# Patient Record
Sex: FEMALE | Race: WHITE | NOT HISPANIC OR LATINO | Employment: FULL TIME | URBAN - METROPOLITAN AREA
[De-identification: names, ages, dates, MRNs, and addresses within clinical notes are randomized per-mention and may not be internally consistent; named-entity substitution may affect disease eponyms.]

---

## 2020-11-25 ENCOUNTER — VIRTUAL VISIT (OUTPATIENT)
Dept: FAMILY MEDICINE | Facility: OTHER | Age: 40
End: 2020-11-25
Payer: COMMERCIAL

## 2020-11-25 DIAGNOSIS — Z20.822 ENCOUNTER FOR LABORATORY TESTING FOR COVID-19 VIRUS: Primary | ICD-10-CM

## 2020-11-25 PROCEDURE — U0003 INFECTIOUS AGENT DETECTION BY NUCLEIC ACID (DNA OR RNA); SEVERE ACUTE RESPIRATORY SYNDROME CORONAVIRUS 2 (SARS-COV-2) (CORONAVIRUS DISEASE [COVID-19]), AMPLIFIED PROBE TECHNIQUE, MAKING USE OF HIGH THROUGHPUT TECHNOLOGIES AS DESCRIBED BY CMS-2020-01-R: HCPCS | Performed by: FAMILY MEDICINE

## 2020-11-25 PROCEDURE — 99421 OL DIG E/M SVC 5-10 MIN: CPT | Performed by: PHYSICIAN ASSISTANT

## 2020-11-25 NOTE — PROGRESS NOTES
"Date: 2020 07:34:45  Clinician: Nuris Hartley  Clinician NPI: [id number]  Patient: Cassidy Klein  Patient : 1980  Patient Address: [address] [place] [address]  Patient Phone: [phone]  Visit Protocol: URI  Patient Summary:  Cassidy is a 40 year old ( : 1980 ) female who initiated a [facility] Visit for COVID-19 (Coronavirus) evaluation and screening. When asked the question \"Please sign me up to receive news, health information and promotions from [facility].\", Cassidy responded \"No\".    Cassidy states her symptoms started gradually 3-4 days ago. After her symptoms started, they improved and then got worse again.   Her symptoms consist of a cough, nasal congestion, myalgia, and a sore throat.   Symptom details     Nasal secretions: The color of her mucus is clear.    Cough: Cassidy coughs every 5-10 minutes and her cough is more bothersome at night. Phlegm comes into her throat when she coughs. She does not believe her cough is caused by post-nasal drip. The color of the phlegm is white, clear, and yellow.     Sore throat: Cassidy reports having severe throat pain (7-9 on a 10 point pain scale), does not have exudate on her tonsils, and can swallow liquids. She is not sure if the lymph nodes in her neck are enlarged. A rash has not appeared on the skin since the sore throat started.      Cassidy denies having ear pain, headache, wheezing, fever, nausea, vomiting, rhinitis, facial pain or pressure, chills, malaise, teeth pain, ageusia, diarrhea, and anosmia. She also denies taking antibiotic medication in the past month and having recent facial or sinus surgery in the past 60 days. She is not experiencing dyspnea.   Precipitating events  Within the past week, Cassidy has not been exposed to someone with strep throat. She has not recently been exposed to someone with influenza. Cassidy has been in close contact with the following high risk individuals: children under the age of 5 and people with " asthma, heart disease or diabetes.   Pertinent COVID-19 (Coronavirus) information  Cassidy does not work or volunteer as healthcare worker or a . In the past 14 days, Cassidy has not worked or volunteered at a healthcare facility or group living setting.   In the past 14 days, she also has not lived in a congregate living setting.   Cassidy has not had a close contact with a laboratory-confirmed COVID-19 patient within 14 days of symptom onset.    Since December 2019, Cassidy has been tested for COVID-19 and has not had upper respiratory infection or influenza-like illness.      Result of COVID-19 test: Negative     Date of her COVID-19 test: 10/01/2020      Pertinent medical history  She has not been told by her provider to avoid NSAIDs.   Cassidy does not get yeast infections when she takes antibiotics.   Cassidy does not have diabetes. She denies having immunosuppressive conditions (e.g., chemotherapy, HIV, organ transplant, long-term use of steroids or other immunosuppressive medications, splenectomy). She does not have severe COPD and congestive heart failure. She does not have asthma.   Cassidy does not need a return to work/school note.   Weight: 130 lbs   Cassidy does not smoke or use smokeless tobacco.   She denies pregnancy and denies breastfeeding. She has menstruated in the past month.   Weight: 130 lbs    MEDICATIONS: levothyroxine oral, ALLERGIES: NKDA  Clinician Response:  Dear Cassidy,  Your health is our priority. Based on the information you have provided, it is possible that you may have some type of viral infection.  Please read the full treatment plan and see my recommendations below.  Medication information  Because you have a viral infection, antibiotics will not help you get better. Treating a viral infection with antibiotics could actually make you feel worse.  Self care  Steps you can take to be as comfortable as possible:     Rest.    Drink plenty of fluids.    Take a warm shower to loosen  congestion    Use a cool-mist humidifier.    Use throat lozenges.    Suck on frozen items such as popsicles.    Drink hot tea with lemon and honey.    Gargle with warm salt water (1/4 teaspoon of salt per 8 ounce glass of water).    Take a spoonful of honey to reduce your cough.     Additional treatment plan   Your symptoms show that you may have coronavirus (COVID-19). This illness can cause fever, cough and trouble breathing. Many people get a mild case and get better on their own. Some people can get very sick.  What should I do?  We would like to test you for this virus.   1. Please call [phone] to schedule your visit. Explain that you were referred by [facility] to have a COVID-19 test. Be ready to share your [facility] visit ID number.  * If you need to schedule in [facility] please call [phone] or for [employer] employees please call [phone].  * If you need to schedule in the [place] area please call [phone]. [facility] employees call [phone].  The following will serve as your written order for this COVID Test, ordered by me, for the indication of suspected COVID [Z20.828]: The test will be ordered in [employer], our electronic health record, after you are scheduled. It will show as ordered and authorized by Be Adame MD.  Order: COVID-19 (Coronavirus) PCR for SYMPTOMATIC testing from [facility].   2. When it's time for your COVID test:  Stay at least 6 feet away from others. (If someone will drive you to your test, stay in the backseat, as far away from the  as you can.)   Cover your mouth and nose with a mask, tissue or washcloth.  Go straight to the testing site. Don't make any stops on the way there or back.      3.Starting now: Stay home and away from others (self-isolate) until:   You've had no fever---and no medicine that reduces fever---for one full day (24 hours). And...   Your other symptoms have gotten better. For example, your cough or breathing has improved. And...   At least 10 days  "have passed since your symptoms started.       During this time, don't leave the house except for testing or medical care.   Stay in your own room, even for meals. Use your own bathroom if you can.   Stay away from others in your home. No hugging, kissing or shaking hands. No visitors.  Don't go to work, school or anywhere else.    Clean \"high touch\" surfaces often (doorknobs, counters, handles, etc.). Use a household cleaning spray or wipes. You'll find a full list of  on the EPA website: www.epa.gov/pesticide-registration/list-n-disinfectants-use-against-sars-cov-2.   Cover your mouth and nose with a mask, tissue or washcloth to avoid spreading germs.  Wash your hands and face often. Use soap and water.  Caregivers in these groups are at risk for severe illness due to COVID-19:  o People 65 years and older  o People who live in a nursing home or long-term care facility  o People with chronic disease (lung, heart, cancer, diabetes, kidney, liver, immunologic)  o People who have a weakened immune system, including those who:   Are in cancer treatment  Take medicine that weakens the immune system, such as corticosteroids  Had a bone marrow or organ transplant  Have an immune deficiency  Have poorly controlled HIV or AIDS  Are obese (body mass index of 40 or higher)  Smoke regularly   o Caregivers should wear gloves while washing dishes, handling laundry and cleaning bedrooms and bathrooms.  o Use caution when washing and drying laundry: Don't shake dirty laundry, and use the warmest water setting that you can.  o For more tips, go to www.cdc.gov/coronavirus/2019-ncov/downloads/10Things.pdf.    4.Sign up for [employer]. We know it's scary to hear that you might have COVID-19. We want to track your symptoms to make sure you're okay over the next 2 weeks. Please look for an email from [employer]---this is a free, online program that we'll use to keep in touch. To sign up, follow the link in the email. Learn " more at [email or web address]  How can I take care of myself?   Get lots of rest. Drink extra fluids (unless a doctor has told you not to).   Take Tylenol (acetaminophen) for fever or pain. If you have liver or kidney problems, ask your family doctor if it's okay to take Tylenol.   Adults can take either:    650 mg (two 325 mg pills) every 4 to 6 hours, or...   1,000 mg (two 500 mg pills) every 8 hours as needed.    Note: Don't take more than 3,000 mg in one day. Acetaminophen is found in many medicines (both prescribed and over-the-counter medicines). Read all labels to be sure you don't take too much.   For children, check the Tylenol bottle for the right dose. The dose is based on the child's age or weight.    If you have other health problems (like cancer, heart failure, an organ transplant or severe kidney disease): Call your specialty clinic if you don't feel better in the next 2 days.       Know when to call 911. Emergency warning signs include:    Trouble breathing or shortness of breath Pain or pressure in the chest that doesn't go away Feeling confused like you haven't felt before, or not being able to wake up Bluish-colored lips or face.  Where can I get more information?   [facility] -- About COVID-19: [email or web address]   CDC -- What to Do If You're Sick: www.cdc.gov/coronavirus/2019-ncov/about/steps-when-sick.html   CDC -- Ending Home Isolation: www.cdc.gov/coronavirus/2019-ncov/hcp/disposition-in-home-patients.html   CDC -- Caring for Someone: www.cdc.gov/coronavirus/2019-ncov/if-you-are-sick/care-for-someone.html   [facility] -- Interim Guidance for Hospital Discharge to Home: www.health.[facility].[place].us/diseases/coronavirus/hcp/hospdischarge.pdf   [facility] clinical trials (COVID-19 research studies): clinicalaffairs.[facility]/n-clinical-trials    Below are the COVID-19 hotlines at the [place] Department of Health ([facility]). Interpreters are available.    [facility]  questions: Call 131-192-5407 or 1-445.869.3373 (7 a.m. to 7 p.m.) For questions about schools and childcare: Call 978-684-0819 or 1-390.659.3152 (7 a.m. to 7 p.m.)    COVID-19 (Coronavirus) General Information  Because there is currently no vaccine to prevent infection, the best way to protect yourself is to avoid being exposed to this virus. Common symptoms of COVID-19 include but are not limited to fever, cough, and shortness of breath. These symptoms appear 2-14 days after you are exposed to the virus that causes COVID-19. Click here for more information from the CDC on how to protect yourself.  If you are sick with COVID-19 or suspect you are infected with the virus that causes COVID-19, follow the steps here from the CDC to help prevent the disease from spreading to people in your home and community.  Click here for general information from the CDC on testing.  If you develop any of these emergency warning signs for COVID-19, get medical attention immediately:     Trouble breathing    Persistent pain or pressure in the chest    New confusion or inability to arouse    Bluish lips or face      Call your doctor or clinic before going in. Call [phone] if you have a medical emergency and notify the  you have or think you may have COVID-19.  For more detailed and up to date information on COVID-19 (Coronavirus), please visit the CDC website.   Diagnosis: Cough  Diagnosis ICD: R05

## 2020-11-26 LAB
SARS-COV-2 RNA SPEC QL NAA+PROBE: NOT DETECTED
SPECIMEN SOURCE: NORMAL

## 2021-01-15 ENCOUNTER — HEALTH MAINTENANCE LETTER (OUTPATIENT)
Age: 41
End: 2021-01-15

## 2021-09-19 ENCOUNTER — HEALTH MAINTENANCE LETTER (OUTPATIENT)
Age: 41
End: 2021-09-19

## 2022-03-06 ENCOUNTER — HEALTH MAINTENANCE LETTER (OUTPATIENT)
Age: 42
End: 2022-03-06

## 2022-11-21 ENCOUNTER — HEALTH MAINTENANCE LETTER (OUTPATIENT)
Age: 42
End: 2022-11-21

## 2022-12-13 ENCOUNTER — HOSPITAL ENCOUNTER (EMERGENCY)
Facility: CLINIC | Age: 42
Discharge: HOME | End: 2022-12-13
Attending: PHYSICIAN ASSISTANT | Admitting: PHYSICIAN ASSISTANT
Payer: COMMERCIAL

## 2022-12-13 VITALS
DIASTOLIC BLOOD PRESSURE: 80 MMHG | WEIGHT: 135 LBS | OXYGEN SATURATION: 97 % | TEMPERATURE: 99 F | HEART RATE: 91 BPM | RESPIRATION RATE: 18 BRPM | SYSTOLIC BLOOD PRESSURE: 116 MMHG | BODY MASS INDEX: 23.05 KG/M2 | HEIGHT: 64 IN

## 2022-12-13 DIAGNOSIS — Z20.3 NEED FOR POST EXPOSURE PROPHYLAXIS FOR RABIES: ICD-10-CM

## 2022-12-13 DIAGNOSIS — Z20.9 EXPOSURE TO BAT WITHOUT KNOWN BITE: ICD-10-CM

## 2022-12-13 PROCEDURE — G0463 HOSPITAL OUTPT CLINIC VISIT: HCPCS | Mod: 25 | Performed by: PHYSICIAN ASSISTANT

## 2022-12-13 PROCEDURE — 96372 THER/PROPH/DIAG INJ SC/IM: CPT | Performed by: PHYSICIAN ASSISTANT

## 2022-12-13 PROCEDURE — 90471 IMMUNIZATION ADMIN: CPT | Performed by: PHYSICIAN ASSISTANT

## 2022-12-13 PROCEDURE — 99213 OFFICE O/P EST LOW 20 MIN: CPT | Performed by: PHYSICIAN ASSISTANT

## 2022-12-13 PROCEDURE — 90675 RABIES VACCINE IM: CPT | Performed by: PHYSICIAN ASSISTANT

## 2022-12-13 PROCEDURE — 90375 RABIES IG IM/SC: CPT | Performed by: PHYSICIAN ASSISTANT

## 2022-12-13 PROCEDURE — 250N000011 HC RX IP 250 OP 636: Performed by: PHYSICIAN ASSISTANT

## 2022-12-13 RX ORDER — ALBUTEROL SULFATE 90 UG/1
1-2 AEROSOL, METERED RESPIRATORY (INHALATION)
Status: CANCELLED
Start: 2022-12-13

## 2022-12-13 RX ORDER — ALBUTEROL SULFATE 0.83 MG/ML
2.5 SOLUTION RESPIRATORY (INHALATION)
Status: CANCELLED | OUTPATIENT
Start: 2022-12-13

## 2022-12-13 RX ORDER — EPINEPHRINE 1 MG/ML
0.3 INJECTION, SOLUTION, CONCENTRATE INTRAVENOUS EVERY 5 MIN PRN
Status: CANCELLED | OUTPATIENT
Start: 2022-12-13

## 2022-12-13 RX ORDER — DIPHENHYDRAMINE HYDROCHLORIDE 50 MG/ML
50 INJECTION INTRAMUSCULAR; INTRAVENOUS
Status: CANCELLED
Start: 2022-12-13

## 2022-12-13 RX ORDER — METHYLPREDNISOLONE SODIUM SUCCINATE 125 MG/2ML
125 INJECTION, POWDER, LYOPHILIZED, FOR SOLUTION INTRAMUSCULAR; INTRAVENOUS
Status: CANCELLED
Start: 2022-12-13

## 2022-12-13 RX ORDER — MEPERIDINE HYDROCHLORIDE 25 MG/ML
25 INJECTION INTRAMUSCULAR; INTRAVENOUS; SUBCUTANEOUS EVERY 30 MIN PRN
Status: CANCELLED | OUTPATIENT
Start: 2022-12-13

## 2022-12-13 RX ADMIN — Medication 1 ML: at 17:52

## 2022-12-13 RX ADMIN — RABIES IMMUNE GLOBULIN (HUMAN) 1230 UNITS: 300 INJECTION, SOLUTION INFILTRATION; INTRAMUSCULAR at 17:52

## 2022-12-13 ASSESSMENT — ACTIVITIES OF DAILY LIVING (ADL): ADLS_ACUITY_SCORE: 33

## 2022-12-13 NOTE — ED TRIAGE NOTES
Exposure to bat last night in same room     Triage Assessment     Row Name 12/13/22 9497       Triage Assessment (Adult)    Airway WDL WDL       Respiratory WDL    Respiratory WDL WDL

## 2022-12-14 NOTE — ED PROVIDER NOTES
"  History     Chief Complaint   Patient presents with     Bat Exposure     HPI  Cassidy Klein is a 42 year old female who presents to the urgent care accompanied by child with concern over exposure to a bat last night.  Patient was attempting to sleep last night out of wrestling sound and has not been wearing them.  She was initially unable to identify where it was however later discovered to a bat.  She denies any obvious bites or stings but was potentially sleeping per part of the exposure.  Her tetanus vaccine is up to date.      Allergies:  Allergies   Allergen Reactions     Robitussin Cough-Allergy      Problem List:    There are no problems to display for this patient.     Past Medical History:    No past medical history on file.    Past Surgical History:    No past surgical history on file.    Family History:    No family history on file.    Social History:  Marital Status:   [2]        Medications:    No current outpatient medications on file.    Review of Systems  CONSTITUTIONAL:NEGATIVE for fever, chills, change in weight  INTEGUMENTARY/SKIN: NEGATIVE for worrisome rashes, moles or lesions  EYES: NEGATIVE for vision changes or irritation  ENT/MOUTH: NEGATIVE for ear, mouth and throat problems  RESP:NEGATIVE for significant cough or SOB  GI: NEGATIVE for nausea, abdominal pain, heartburn, or change in bowel habits  Physical Exam   BP: 116/80  Pulse: 91  Temp: 99  F (37.2  C)  Resp: 18  Height: 162.6 cm (5' 4\")  Weight: 61.2 kg (135 lb)  SpO2: 97 %  Physical Exam  GENERAL APPEARANCE: healthy, alert cooperative no acute distress  EYES: EOMI,  PERRL, conjunctiva clear  HENT: normocephalic, atraumatic   NECK: supple, nontender, no lymphadenopathy  RESP: no respiratory distress, speaking in full sentences without difficulty.   SKIN: no suspicious lesions or rashes on exposed areas of skin   ED Course           Procedures       Critical Care time:  none          No results found for this or any previous " visit (from the past 24 hour(s)).    Medications   rabies vaccine, PCEC (chick embryo derived) (RABAVERT) injection 1 mL (1 mL Intramuscular Given 12/13/22 1752)   rabies immune globulin 300 units/mL (HYPERAB) injection 1,230 Units (1,230 Units Intramuscular Given 12/13/22 1752)     Assessments & Plan (with Medical Decision Making)     I have reviewed the nursing notes.  I have reviewed the findings, diagnosis, plan and need for follow up with the patient.       There are no discharge medications for this patient.    Final diagnoses:   Exposure to bat without known bite   Need for post exposure prophylaxis for rabies     22-year-old female presents to the urgent care with concern over potential rabies prophylaxis after she awoke with a bat in her room last night.  Physical exam findings were benign.  Discussed with patient that she would qualify for rabies vaccination as well as HyperRAB injection.  She tolerated injections without any immediate complications.  She was discharged home with instructions to receive second dose of rabies vaccination 10/16/2022 and subsequently on day 7 and day 14.  Patient states understanding.  All questions were answered prior to discharge.      12/13/2022   [facility] EMERGENCY DEPT     Princess Ugarte PA-C  12/13/22 2021

## 2022-12-16 ENCOUNTER — HOSPITAL ENCOUNTER (EMERGENCY)
Facility: CLINIC | Age: 42
Discharge: HOME | End: 2022-12-16
Admitting: PHYSICIAN ASSISTANT
Payer: COMMERCIAL

## 2022-12-16 VITALS — RESPIRATION RATE: 20 BRPM | OXYGEN SATURATION: 98 % | HEART RATE: 87 BPM | TEMPERATURE: 99.3 F

## 2022-12-16 DIAGNOSIS — Z20.9 EXPOSURE TO BAT WITHOUT KNOWN BITE: ICD-10-CM

## 2022-12-16 DIAGNOSIS — Z20.3 NEED FOR POST EXPOSURE PROPHYLAXIS FOR RABIES: ICD-10-CM

## 2022-12-16 PROCEDURE — 90675 RABIES VACCINE IM: CPT | Performed by: PHYSICIAN ASSISTANT

## 2022-12-16 PROCEDURE — 90471 IMMUNIZATION ADMIN: CPT | Performed by: PHYSICIAN ASSISTANT

## 2022-12-16 PROCEDURE — 999N000064 HC STATISTIC EXAM NO CHARGES: Performed by: PHYSICIAN ASSISTANT

## 2022-12-16 PROCEDURE — 250N000011 HC RX IP 250 OP 636: Performed by: PHYSICIAN ASSISTANT

## 2022-12-16 RX ORDER — MEPERIDINE HYDROCHLORIDE 25 MG/ML
25 INJECTION INTRAMUSCULAR; INTRAVENOUS; SUBCUTANEOUS EVERY 30 MIN PRN
Status: CANCELLED | OUTPATIENT
Start: 2022-12-20

## 2022-12-16 RX ORDER — ALBUTEROL SULFATE 90 UG/1
1-2 AEROSOL, METERED RESPIRATORY (INHALATION)
Status: CANCELLED
Start: 2022-12-20

## 2022-12-16 RX ORDER — MEPERIDINE HYDROCHLORIDE 25 MG/ML
25 INJECTION INTRAMUSCULAR; INTRAVENOUS; SUBCUTANEOUS EVERY 30 MIN PRN
Status: DISCONTINUED | OUTPATIENT
Start: 2022-12-16 | End: 2022-12-16 | Stop reason: HOSPADM

## 2022-12-16 RX ORDER — EPINEPHRINE 1 MG/ML
0.3 INJECTION, SOLUTION, CONCENTRATE INTRAVENOUS EVERY 5 MIN PRN
Status: CANCELLED | OUTPATIENT
Start: 2022-12-20

## 2022-12-16 RX ORDER — EPINEPHRINE 1 MG/ML
0.3 INJECTION, SOLUTION, CONCENTRATE INTRAVENOUS EVERY 5 MIN PRN
Status: DISCONTINUED | OUTPATIENT
Start: 2022-12-16 | End: 2022-12-16 | Stop reason: HOSPADM

## 2022-12-16 RX ORDER — ALBUTEROL SULFATE 0.83 MG/ML
2.5 SOLUTION RESPIRATORY (INHALATION)
Status: CANCELLED | OUTPATIENT
Start: 2022-12-20

## 2022-12-16 RX ORDER — METHYLPREDNISOLONE SODIUM SUCCINATE 125 MG/2ML
125 INJECTION, POWDER, LYOPHILIZED, FOR SOLUTION INTRAMUSCULAR; INTRAVENOUS
Status: DISCONTINUED | OUTPATIENT
Start: 2022-12-16 | End: 2022-12-16 | Stop reason: HOSPADM

## 2022-12-16 RX ORDER — METHYLPREDNISOLONE SODIUM SUCCINATE 125 MG/2ML
125 INJECTION, POWDER, LYOPHILIZED, FOR SOLUTION INTRAMUSCULAR; INTRAVENOUS
Status: CANCELLED
Start: 2022-12-20

## 2022-12-16 RX ORDER — ALBUTEROL SULFATE 90 UG/1
1-2 AEROSOL, METERED RESPIRATORY (INHALATION)
Status: DISCONTINUED | OUTPATIENT
Start: 2022-12-16 | End: 2022-12-16 | Stop reason: HOSPADM

## 2022-12-16 RX ORDER — ALBUTEROL SULFATE 0.83 MG/ML
2.5 SOLUTION RESPIRATORY (INHALATION)
Status: DISCONTINUED | OUTPATIENT
Start: 2022-12-16 | End: 2022-12-16 | Stop reason: HOSPADM

## 2022-12-16 RX ORDER — DIPHENHYDRAMINE HYDROCHLORIDE 50 MG/ML
50 INJECTION INTRAMUSCULAR; INTRAVENOUS
Status: DISCONTINUED | OUTPATIENT
Start: 2022-12-16 | End: 2022-12-16 | Stop reason: HOSPADM

## 2022-12-16 RX ORDER — DIPHENHYDRAMINE HYDROCHLORIDE 50 MG/ML
50 INJECTION INTRAMUSCULAR; INTRAVENOUS
Status: CANCELLED
Start: 2022-12-20

## 2022-12-16 RX ADMIN — Medication 1 ML: at 17:34

## 2022-12-16 ASSESSMENT — ACTIVITIES OF DAILY LIVING (ADL): ADLS_ACUITY_SCORE: 35

## 2022-12-20 ENCOUNTER — HOSPITAL ENCOUNTER (EMERGENCY)
Facility: CLINIC | Age: 42
Discharge: HOME | End: 2022-12-20
Admitting: PHYSICIAN ASSISTANT
Payer: COMMERCIAL

## 2022-12-20 VITALS
SYSTOLIC BLOOD PRESSURE: 102 MMHG | HEART RATE: 93 BPM | TEMPERATURE: 98.3 F | OXYGEN SATURATION: 98 % | RESPIRATION RATE: 18 BRPM | DIASTOLIC BLOOD PRESSURE: 74 MMHG

## 2022-12-20 PROCEDURE — 90675 RABIES VACCINE IM: CPT | Performed by: PHYSICIAN ASSISTANT

## 2022-12-20 PROCEDURE — 250N000011 HC RX IP 250 OP 636: Performed by: PHYSICIAN ASSISTANT

## 2022-12-20 PROCEDURE — 90471 IMMUNIZATION ADMIN: CPT | Performed by: PHYSICIAN ASSISTANT

## 2022-12-20 PROCEDURE — 999N000104 HC STATISTIC NO CHARGE: Performed by: PHYSICIAN ASSISTANT

## 2022-12-20 RX ADMIN — Medication 1 ML: at 17:39

## 2022-12-27 ENCOUNTER — HOSPITAL ENCOUNTER (EMERGENCY)
Facility: CLINIC | Age: 42
Discharge: HOME | End: 2022-12-27
Admitting: PHYSICIAN ASSISTANT
Payer: COMMERCIAL

## 2022-12-27 DIAGNOSIS — Z20.9 EXPOSURE TO BAT WITHOUT KNOWN BITE: ICD-10-CM

## 2022-12-27 DIAGNOSIS — Z20.3 NEED FOR POST EXPOSURE PROPHYLAXIS FOR RABIES: ICD-10-CM

## 2022-12-27 PROCEDURE — 90675 RABIES VACCINE IM: CPT | Performed by: PHYSICIAN ASSISTANT

## 2022-12-27 PROCEDURE — 250N000011 HC RX IP 250 OP 636: Performed by: PHYSICIAN ASSISTANT

## 2022-12-27 PROCEDURE — 999N000104 HC STATISTIC NO CHARGE: Performed by: PHYSICIAN ASSISTANT

## 2022-12-27 PROCEDURE — 90471 IMMUNIZATION ADMIN: CPT | Performed by: PHYSICIAN ASSISTANT

## 2022-12-27 RX ORDER — DIPHENHYDRAMINE HYDROCHLORIDE 50 MG/ML
50 INJECTION INTRAMUSCULAR; INTRAVENOUS
Status: DISCONTINUED | OUTPATIENT
Start: 2022-12-27 | End: 2022-12-27 | Stop reason: HOSPADM

## 2022-12-27 RX ORDER — ALBUTEROL SULFATE 90 UG/1
1-2 AEROSOL, METERED RESPIRATORY (INHALATION)
Status: DISCONTINUED | OUTPATIENT
Start: 2022-12-27 | End: 2022-12-27 | Stop reason: HOSPADM

## 2022-12-27 RX ORDER — METHYLPREDNISOLONE SODIUM SUCCINATE 125 MG/2ML
125 INJECTION, POWDER, LYOPHILIZED, FOR SOLUTION INTRAMUSCULAR; INTRAVENOUS
Status: CANCELLED
Start: 2022-12-30

## 2022-12-27 RX ORDER — MEPERIDINE HYDROCHLORIDE 25 MG/ML
25 INJECTION INTRAMUSCULAR; INTRAVENOUS; SUBCUTANEOUS EVERY 30 MIN PRN
Status: CANCELLED | OUTPATIENT
Start: 2022-12-30

## 2022-12-27 RX ORDER — DIPHENHYDRAMINE HYDROCHLORIDE 50 MG/ML
50 INJECTION INTRAMUSCULAR; INTRAVENOUS
Status: CANCELLED
Start: 2022-12-30

## 2022-12-27 RX ORDER — EPINEPHRINE 1 MG/ML
0.3 INJECTION, SOLUTION, CONCENTRATE INTRAVENOUS EVERY 5 MIN PRN
Status: DISCONTINUED | OUTPATIENT
Start: 2022-12-27 | End: 2022-12-27 | Stop reason: HOSPADM

## 2022-12-27 RX ORDER — ALBUTEROL SULFATE 0.83 MG/ML
2.5 SOLUTION RESPIRATORY (INHALATION)
Status: CANCELLED | OUTPATIENT
Start: 2022-12-30

## 2022-12-27 RX ORDER — EPINEPHRINE 1 MG/ML
0.3 INJECTION, SOLUTION, CONCENTRATE INTRAVENOUS EVERY 5 MIN PRN
Status: CANCELLED | OUTPATIENT
Start: 2022-12-30

## 2022-12-27 RX ORDER — ALBUTEROL SULFATE 0.83 MG/ML
2.5 SOLUTION RESPIRATORY (INHALATION)
Status: DISCONTINUED | OUTPATIENT
Start: 2022-12-27 | End: 2022-12-27 | Stop reason: HOSPADM

## 2022-12-27 RX ORDER — MEPERIDINE HYDROCHLORIDE 25 MG/ML
25 INJECTION INTRAMUSCULAR; INTRAVENOUS; SUBCUTANEOUS EVERY 30 MIN PRN
Status: DISCONTINUED | OUTPATIENT
Start: 2022-12-27 | End: 2022-12-27 | Stop reason: HOSPADM

## 2022-12-27 RX ORDER — ALBUTEROL SULFATE 90 UG/1
1-2 AEROSOL, METERED RESPIRATORY (INHALATION)
Status: CANCELLED
Start: 2022-12-30

## 2022-12-27 RX ORDER — METHYLPREDNISOLONE SODIUM SUCCINATE 125 MG/2ML
125 INJECTION, POWDER, LYOPHILIZED, FOR SOLUTION INTRAMUSCULAR; INTRAVENOUS
Status: DISCONTINUED | OUTPATIENT
Start: 2022-12-27 | End: 2022-12-27 | Stop reason: HOSPADM

## 2022-12-27 RX ADMIN — Medication 1 ML: at 18:06

## 2023-04-07 ENCOUNTER — APPOINTMENT (OUTPATIENT)
Dept: LAB | Facility: CLINIC | Age: 43
End: 2023-04-07
Payer: COMMERCIAL

## 2023-04-16 ENCOUNTER — HEALTH MAINTENANCE LETTER (OUTPATIENT)
Age: 43
End: 2023-04-16

## 2024-05-30 ENCOUNTER — PATIENT OUTREACH (OUTPATIENT)
Dept: ONCOLOGY | Facility: CLINIC | Age: 44
End: 2024-05-30
Payer: COMMERCIAL

## 2024-05-30 ENCOUNTER — OFFICE VISIT (OUTPATIENT)
Dept: OBGYN | Facility: CLINIC | Age: 44
End: 2024-05-30
Attending: OBSTETRICS & GYNECOLOGY
Payer: COMMERCIAL

## 2024-05-30 VITALS
SYSTOLIC BLOOD PRESSURE: 118 MMHG | DIASTOLIC BLOOD PRESSURE: 81 MMHG | BODY MASS INDEX: 20.66 KG/M2 | HEART RATE: 111 BPM | HEIGHT: 64 IN | WEIGHT: 121 LBS

## 2024-05-30 DIAGNOSIS — N93.9 ABNORMAL UTERINE BLEEDING (AUB): Primary | ICD-10-CM

## 2024-05-30 DIAGNOSIS — Z15.89 BIALLELIC MUTATION OF FANCM GENE: ICD-10-CM

## 2024-05-30 DIAGNOSIS — D25.9 UTERINE LEIOMYOMA, UNSPECIFIED LOCATION: ICD-10-CM

## 2024-05-30 PROCEDURE — 99213 OFFICE O/P EST LOW 20 MIN: CPT | Performed by: OBSTETRICS & GYNECOLOGY

## 2024-05-30 PROCEDURE — 99204 OFFICE O/P NEW MOD 45 MIN: CPT | Performed by: OBSTETRICS & GYNECOLOGY

## 2024-05-30 RX ORDER — LEVOTHYROXINE SODIUM 100 UG/1
TABLET ORAL
COMMUNITY
Start: 2024-05-22

## 2024-05-30 RX ORDER — FAMOTIDINE 20 MG/1
20 TABLET, FILM COATED ORAL
COMMUNITY
Start: 2024-01-24 | End: 2025-01-23

## 2024-05-30 RX ORDER — FERROUS GLUCONATE 324(38)MG
324 TABLET ORAL
COMMUNITY
Start: 2024-05-22

## 2024-05-30 RX ORDER — FLUTICASONE PROPIONATE 50 MCG
2 SPRAY, SUSPENSION (ML) NASAL
COMMUNITY
Start: 2024-05-13

## 2024-05-30 RX ORDER — LEVOTHYROXINE SODIUM 125 UG/1
TABLET ORAL
COMMUNITY
Start: 2024-03-25

## 2024-05-30 RX ORDER — LEVOTHYROXINE SODIUM 50 UG/1
50 CAPSULE ORAL
COMMUNITY

## 2024-05-30 RX ORDER — CETIRIZINE HYDROCHLORIDE 10 MG/1
10 TABLET ORAL DAILY
COMMUNITY
Start: 2024-03-25

## 2024-05-30 ASSESSMENT — ANXIETY QUESTIONNAIRES
7. FEELING AFRAID AS IF SOMETHING AWFUL MIGHT HAPPEN: NOT AT ALL
GAD7 TOTAL SCORE: 0
GAD7 TOTAL SCORE: 0
5. BEING SO RESTLESS THAT IT IS HARD TO SIT STILL: NOT AT ALL
1. FEELING NERVOUS, ANXIOUS, OR ON EDGE: NOT AT ALL
7. FEELING AFRAID AS IF SOMETHING AWFUL MIGHT HAPPEN: NOT AT ALL
IF YOU CHECKED OFF ANY PROBLEMS ON THIS QUESTIONNAIRE, HOW DIFFICULT HAVE THESE PROBLEMS MADE IT FOR YOU TO DO YOUR WORK, TAKE CARE OF THINGS AT HOME, OR GET ALONG WITH OTHER PEOPLE: NOT DIFFICULT AT ALL
8. IF YOU CHECKED OFF ANY PROBLEMS, HOW DIFFICULT HAVE THESE MADE IT FOR YOU TO DO YOUR WORK, TAKE CARE OF THINGS AT HOME, OR GET ALONG WITH OTHER PEOPLE?: NOT DIFFICULT AT ALL
4. TROUBLE RELAXING: NOT AT ALL
3. WORRYING TOO MUCH ABOUT DIFFERENT THINGS: NOT AT ALL
6. BECOMING EASILY ANNOYED OR IRRITABLE: NOT AT ALL
2. NOT BEING ABLE TO STOP OR CONTROL WORRYING: NOT AT ALL

## 2024-05-30 NOTE — PROGRESS NOTES
Chief Complaint   Patient presents with    Establish Care     Breast exam    Evelyn Velázquez LPN   Answers submitted by the patient for this visit:  NANCY-7 (Submitted on 5/30/2024)  NANCY 7 TOTAL SCORE: 0

## 2024-05-30 NOTE — PROGRESS NOTES
New Patient Oncology Nurse Navigator Note     Referring provider: Shira Montero MD      Referring Clinic/Organization: 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  Sent to NPS to process.    Tamara CUEVAN, RN   Oncology Nurse Navigator   [facility]   [phone] / [phone]

## 2024-05-30 NOTE — PROGRESS NOTES
Cassidy is a 45 yo  patient who presents to [facility] care.  Her mother is a patient of mine and she works in the [employer] system so she decided to have GYN care here.    Cassidy explains that her periods have become progressively heavier.  They continue to be regular and monthly, but days 2 & 3 are very heavy. She had recent labs with her endocrinologist and was found to be slightly anemic (hgb 11.1, ferritin 6).  She had been anemic in the past, but counts had improved and she was able to stop iron.  Now after this lab she had restarted oral iron.    Her thyroid has been somewhat difficult to control.  She continues to work with endocrine.  Her last TSH/free T4 were normal, but total T3 was low.    Patient has known gene abnormality of the FANC-M gene.  This was diagnosed when her maternal grandfather was diagnosed with prostate cancer.  She had testing at [facility].  She had some genetic counseling then, but is interested in reviewing again to see if additional information is available or if there is any specific guidance for cancer screening.    She has been having annual pap smears due to this mutation which have been normal.  She has no family history of breast cancer so has been recommended for any additional screening.  She is concerned because on a recent mammogram a likely fibroadenoma was found.  She has always had dense breasts and has been cautioned regarding this effect on diagnostic capability of mammogram, but she was shocked that this finding had not been mentioned before.  The radiologist did review prior images and stated that it was there previously and is growing at an expected rate.  All of this new information worries her.    She is also wondering about uterine fibroids.  Her mother has them and she was told kthere were small fibroids in her uterus at the time of her last delivery ().     Past Medical History:   Diagnosis Date    Anemia, iron deficiency     Fibroid uterus      "Gastroesophageal reflux disease without esophagitis     Thyroid disease      Past Surgical History:   Procedure Laterality Date     SECTION  2018    noted fibroids on uterus     Family History   Problem Relation Age of Onset    Genetic Disorder Mother         FANCM gene mutation    Fibroids Mother     Prostate Cancer Maternal Grandfather         tested positive for FANCM gene    Breast Cancer Half-Sister 50        share same father, half sister has breast cancer history on her mother's side of family     Physical exam:  /81   Pulse 111   Ht 1.626 m (5' 4\")   Wt 54.9 kg (121 lb)   LMP 2024   BMI 20.77 kg/m    Gen'l: appears well, no acute distress    Physical exam deferred    Assessment/Plan:  43 yo with history of AUB and uterine fibroids  FANC-M gene mutation    - we reviewed causes of AUB.  We discussed evaluation including ultrasound for structural anomalies.  We discussed anovulatory cycles in the perimenopausal time.  Patient had been considering endometrial ablation as she is concerned about use of hormonal therapy and cancer risk.  She uses partner vasectomy for contraception, so that is not an issue.  We discussed about difficult for screening and testing for endometrial abnormalities after ablation and from my brief review of the genetic mutation that may be a risk.  After discussion patient may consider Mirena IUD.  - referral to genetic counseling to review prior testing and consider additional.  Also review any specific cancer screening recommendations.  - return to clinic after above to review, possible Mirena IUD and EMB.    On the day of this encounter 45 minutes of time was spent in consultation with face-to-face discussion, review of historical information, documentation and post visit activities including communication with other providers and coordination of patient care.    Shira Montero MD   "

## 2024-05-30 NOTE — PATIENT INSTRUCTIONS
Thank you for trusting us with your care!     If you need to contact us for questions about:  Symptoms, Scheduling & Medical Questions; Non-urgent (2-3 day response) Krys message, Urgent (needing response today) [phone] (if after 3:30pm next day response)   Prescriptions: Please call your Pharmacy   Billing: Anju [phone] or [facility]:[phone]

## 2024-05-30 NOTE — LETTER
5/30/2024       RE: Cassidy Klein  [address] [facility] [address]     Dear Colleague,    Thank you for referring your patient, Cassidy Klein, to the [facility] [place] at [facility]. Please see a copy of my visit note below.    Chief Complaint   [facility] presents with    [facility]     Breast exam    Evelyn Velázquez LPN   Answers submitted by the patient for this visit:  NANCY-7 (Submitted on 5/30/2024)  NANCY 7 TOTAL SCORE: 0      Cassidy is a 43 yo  patient who presents to establish care.  Her mother is a patient of mine and she works in the [employer] system so she decided to have GYN care here.    Cassidy explains that her periods have become progressively heavier.  They continue to be regular and monthly, but days 2 & 3 are very heavy. She had recent labs with her endocrinologist and was found to be slightly anemic (hgb 11.1, ferritin 6).  She had been anemic in the past, but counts had improved and she was able to stop iron.  Now after this lab she had restarted oral iron.    Her thyroid has been somewhat difficult to control.  She continues to work with endocrine.  Her last TSH/free T4 were normal, but total T3 was low.    Patient has known gene abnormality of the FANC-M gene.  This was diagnosed when her maternal grandfather was diagnosed with prostate cancer.  She had testing at [facility].  She had some genetic counseling then, but is interested in reviewing again to see if additional information is available or if there is any specific guidance for cancer screening.    She has been having annual pap smears due to this mutation which have been normal.  She has no family history of breast cancer so has been recommended for any additional screening.  She is concerned because on a recent mammogram a likely fibroadenoma was found.  She has always had dense breasts and has been cautioned regarding this effect on diagnostic  "capability of mammogram, but she was shocked that this finding had not been mentioned before.  The radiologist did review prior images and stated that it was there previously and is growing at an expected rate.  All of this new information worries her.    She is also wondering about uterine fibroids.  Her mother has them and she was told kthere were small fibroids in her uterus at the time of her last delivery ().     Past Medical History:   Diagnosis Date    Anemia, iron deficiency     Fibroid uterus     Gastroesophageal reflux disease without esophagitis     Thyroid disease      Past Surgical History:   Procedure Laterality Date     SECTION      noted fibroids on uterus     Family History   Problem Relation Age of Onset    Genetic Disorder Mother         FANCM gene mutation    Fibroids Mother     Prostate Cancer Maternal Grandfather         tested positive for FANCM gene    Breast Cancer Half-Sister 50        share same father, half sister has breast cancer history on her mother's side of family     Physical exam:  /81   Pulse 111   Ht 1.626 m (5' 4\")   Wt 54.9 kg (121 lb)   LMP 2024   BMI 20.77 kg/m    Gen'l: appears well, no acute distress    Physical exam deferred    Assessment/Plan:  45 yo with history of AUB and uterine fibroids  FANC-M gene mutation    - we reviewed causes of AUB.  We discussed evaluation including ultrasound for structural anomalies.  We discussed anovulatory cycles in the perimenopausal time.  Patient had been considering endometrial ablation as she is concerned about use of hormonal therapy and cancer risk.  She uses partner vasectomy for contraception, so that is not an issue.  We discussed about difficult for screening and testing for endometrial abnormalities after ablation and from my brief review of the genetic mutation that may be a risk.  After discussion patient may consider Mirena IUD.  - referral to genetic counseling to review prior testing " and consider additional.  Also review any specific cancer screening recommendations.  - return to clinic after above to review, possible Mirena IUD and EMB.    On the day of this encounter 45 minutes of time was spent in consultation with face-to-face discussion, review of historical information, documentation and post visit activities including communication with other providers and coordination of patient care.    Shira Montero MD

## 2024-06-23 ENCOUNTER — HEALTH MAINTENANCE LETTER (OUTPATIENT)
Age: 44
End: 2024-06-23

## 2024-07-16 ENCOUNTER — TRANSFERRED RECORDS (OUTPATIENT)
Dept: HEALTH INFORMATION MANAGEMENT | Facility: CLINIC | Age: 44
End: 2024-07-16
Payer: COMMERCIAL

## 2024-09-26 ENCOUNTER — OFFICE VISIT (OUTPATIENT)
Dept: OBGYN | Facility: CLINIC | Age: 44
End: 2024-09-26
Attending: OBSTETRICS & GYNECOLOGY
Payer: COMMERCIAL

## 2024-09-26 VITALS
DIASTOLIC BLOOD PRESSURE: 77 MMHG | SYSTOLIC BLOOD PRESSURE: 118 MMHG | BODY MASS INDEX: 21.29 KG/M2 | HEART RATE: 79 BPM | HEIGHT: 64 IN | WEIGHT: 124.7 LBS

## 2024-09-26 DIAGNOSIS — N93.9 ABNORMAL UTERINE BLEEDING (AUB): Primary | ICD-10-CM

## 2024-09-26 DIAGNOSIS — D25.9 UTERINE LEIOMYOMA, UNSPECIFIED LOCATION: ICD-10-CM

## 2024-09-26 PROCEDURE — 99214 OFFICE O/P EST MOD 30 MIN: CPT | Performed by: OBSTETRICS & GYNECOLOGY

## 2024-09-26 PROCEDURE — 99213 OFFICE O/P EST LOW 20 MIN: CPT | Performed by: OBSTETRICS & GYNECOLOGY

## 2024-09-26 NOTE — LETTER
"2024       RE: Cassidy Klein  [address] [facility] [address]     Dear Colleague,    Thank you for referring your patient, Csasidy Klein, to the [facility] [place] at [facility]. Please see a copy of my visit note below.    Patient is a 43 yo  with AUB who was seen in May to discuss treatment options.  She was considering endometrial ablation vs IUD.  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 After our discussion she is leaning toward Mirena IUD.  She would like to consider this with her next annual exam.    Past Medical History:   Diagnosis Date     Anemia, iron deficiency      Fibroid uterus      Gastroesophageal reflux disease without esophagitis      Thyroid disease      Past Surgical History:   Procedure Laterality Date      SECTION      noted fibroids on uterus     Family History   Problem Relation Age of Onset     Genetic Disorder Mother         FANCM gene mutation     Fibroids Mother      Prostate Cancer Maternal Grandfather         tested positive for FANCM gene     Breast Cancer Half-Sister 50        share same father, half sister has breast cancer history on her mother's side of family     Physical exam:  /77   Pulse 79   Ht 1.626 m (5' 4\")   Wt 56.6 kg (124 lb 11.2 oz)   LMP 2024 (Approximate)   BMI 21.40 kg/m     Gen'l: appears well, no distress  Remainder of exam deferred.    Review of ultrasound 24:  Uterus 9.3 x 6.7 x 5.8 cm, retroverted. 1.5 cm intramural fibroid of posterior left fundus.  Endometrium 13 mm, normal smooth  Right ovary 3.5 x 2.6 x 2.2 cm, 2.2 cm complex cyst c/w benign hemorrhagic cyst  Left ovary 3.2 x 1.1 x 0.8 cm    Review of findings at last :  3 small seedling myomas, each a few mms in size, located on the anterior fundal portion.     Assessment/Plan: 43 yo with AUB, small intramural fibroid    - discussed plan for " EMB/Mirena placement.  Reviewed options for pain management including paracervical block, pretreatment with pain medication or doing under sedation.  Patient plans tylenol prior with clonazepam (has prescription at home).  Plan paracervical block with procedure.  - patient consented regarding risk of procedure, including risk of uterine perforation.  Will plan for  to visit and sign consent at the time of procedure.    On the day of this encounter 30 minutes of time was spent in consultation with face-to-face discussion, review of historical information, documentation and post visit activities including communication with other providers and coordination of patient care.    Shira Montero MD       Again, thank you for allowing me to participate in the care of your patient.      Sincerely,    Shira Montero MD

## 2024-09-26 NOTE — PATIENT INSTRUCTIONS
Thank you for trusting us with your care!   Please be aware, if you are on Mychart, you may see your results prior to your providers review. If labs are abnormal, we will call or message you on mAPPnt with a follow up plan.    If you need to contact us for questions about:  Symptoms, Scheduling & Medical Questions; Non-urgent (2-3 day response) Flipaste message, Urgent (needing response today) [phone] (if after 3:30pm next day response)   Prescriptions: Please call your Pharmacy   Billing: Anju [phone] or [facility]:[phone]

## 2024-09-26 NOTE — PROGRESS NOTES
"Patient is a 45 yo  with AUB who was seen in May to discuss treatment options.  She was considering endometrial ablation vs IUD.  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 After our discussion she is leaning toward Mirena IUD.  She would like to consider this with her next annual exam.    Past Medical History:   Diagnosis Date    Anemia, iron deficiency     Fibroid uterus     Gastroesophageal reflux disease without esophagitis     Thyroid disease      Past Surgical History:   Procedure Laterality Date     SECTION      noted fibroids on uterus     Family History   Problem Relation Age of Onset    Genetic Disorder Mother         FANCM gene mutation    Fibroids Mother     Prostate Cancer Maternal Grandfather         tested positive for FANCM gene    Breast Cancer Half-Sister 50        share same father, half sister has breast cancer history on her mother's side of family     Physical exam:  /77   Pulse 79   Ht 1.626 m (5' 4\")   Wt 56.6 kg (124 lb 11.2 oz)   LMP 2024 (Approximate)   BMI 21.40 kg/m     Gen'l: appears well, no distress  Remainder of exam deferred.    Review of ultrasound 24:  Uterus 9.3 x 6.7 x 5.8 cm, retroverted. 1.5 cm intramural fibroid of posterior left fundus.  Endometrium 13 mm, normal smooth  Right ovary 3.5 x 2.6 x 2.2 cm, 2.2 cm complex cyst c/w benign hemorrhagic cyst  Left ovary 3.2 x 1.1 x 0.8 cm    Review of findings at last :  3 small seedling myomas, each a few mms in size, located on the anterior fundal portion.     Assessment/Plan: 45 yo with AUB, small intramural fibroid    - discussed plan for EMB/Mirena placement.  Reviewed options for pain management including paracervical block, pretreatment with pain medication or doing under sedation.  Patient plans tylenol prior with clonazepam (has prescription at home).  Plan paracervical block with procedure.  - patient consented regarding risk of procedure, including risk " of uterine perforation.  Will plan for  to visit and sign consent at the time of procedure.    On the day of this encounter 30 minutes of time was spent in consultation with face-to-face discussion, review of historical information, documentation and post visit activities including communication with other providers and coordination of patient care.    Shira Montero MD

## 2024-09-26 NOTE — NURSING NOTE
Chief Complaint   Patient presents with    Follow Up     Discuss ultrasound results done on 07/16

## 2024-10-28 NOTE — PROGRESS NOTES
Virtual Visit Details  Type of service:  Video Visit   Originating Location (pt. Location): Home  Distant Location (provider location):  Off-site  Platform used for Video Visit: AmBlogRadio    10/29/2024    Referring Provider: Shira Montero MD     Presenting Information:   I met with Cassidy Klein today for her video genetic counseling visit through the Cancer Risk Management Program to discuss her personal history of a FANCM mutation and family history of breast and prostate cancer. She is here today to review this history, cancer screening recommendations, and available genetic testing options.    Personal History:  Cassidy is a 44 year old female. She does not have any personal history of cancer. In her hormonal-based medical history, she had her first menstrual period at age 14, her first child at age 26, and is premenopausal. Cassidy has her ovaries, fallopian tubes and uterus in place, and reports no ovarian cancer screening to date. She reports no history of hormone replacement therapy. Cassidy reports oral contraceptive use for approximately 6 months.       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 It has been described both as a biallelic mutation in the FANCM gene and as indicating carrier status, though Cassidy could not recall whether one or two mutations were identified. A copy of her genetic testing report was unavailable for review today.     Cassidy has annual clinical breast exams and mammograms. Her most recent mammogram in March 2024 found a mass with obscured margins in the right breast. A follow-up ultrasound felt that this most likely represented a fibroadenoma. Cassidy has not had a colonoscopy but planned to scheduled one soon. She reports a history of dermatological exams. She does not regularly do any other cancer screening at this time. Cassidy reported a history of nicotine or tobacco use for approximately 5 years and occasional alcohol  use.    Family History: (Please see scanned pedigree for detailed family history information)  Cassidy's mother reportedly tested positive for the familial FANCM mutation. She is currently 72.  Maternal grandfather was diagnosed with prostate cancer in his late 80's. He underwent genetic testing and was reportedly positive for a FANCM mutation. He passed away at age 90.  Cassidy's father was diagnosed with non-metastatic prostate cancer in his 60's. He is currently 74.  Paternal half sister was diagnosed with breast cancer at age 49 or 50. It was reported that she had negative genetic testing, however, a copy of her results were unavailable for review. She is currently 53.  Her maternal ethnicity is [place] and [place]. Her paternal ethnicity is unknown. There is no known Ashkenazi Confucianist ancestry on either side of her family. There is no reported consanguinity.    Family Structure  Daughters: 2; Sons: 1  Sisters: 1; Brothers: 1; Paternal half sisters: 1  Maternal aunts: 0; Maternal uncles: 1  Paternal aunts: 2; Paternal uncles: 2    Discussion:  We briefly reviewed basic genetics principles. Many of the genes we are born with impact our risk of certain diseases, such as cancer.  When one of these genes is not working properly due to a mistake (known as a  mutation  or  variant ), this may lead to an increased risk of developing cancer.   We reviewed the features of sporadic, familial, and hereditary cancers.   Cancer is a common diagnosis which impacts many families. The vast majority of cancers are considered sporadic and not primarily due to an inherited factor. Individuals can develop cancer due to aging, chance events, environmental exposures or lifestyles.  Only ~5-10% of cancer diagnoses are thought to be caused by inheriting a mutation or variant within a single cancer susceptibility gene. Features typically seen in these high risk families include: cancers diagnosed under age 50, multiple relatives with similar  cancers on the same side of the family, cancers in multiple generations, and relatives with certain rare cancers (i.e., male breast cancer).   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  Cassidy stated that her maternal grandfather was diagnosed with prostate cancer in his late 80's and underwent genetic testing. He was the first individual identified to have the FANCM mutation. Afterwards her mother had positive genetic testing for the familial FANCM mutation, and then Cassidy had positive testing.   Cassidy's chart describes her results as both a biallelic mutation in the FANCM gene and elsewhere as carrier status. Cassidy could not recall if one or two mutations were identified. A copy of her genetic testing report was unavailable for review today. However, she report that this would have been done in 2019 at [facility]. Cassidy was also unsure whether her testing included additional genes.   We discussed the importance of obtaining a copy of Cassidy's genetic testing results for review to verify and clarify the reported findings. Access to these results will allow for a thorough review, ensuring accuracy in assessing risk, and enabling use to make informed recommendations for screening and management. Our Genetics department  will help facilitate the KAREN process.   We reviewed current information regarding the FANCM gene.  Established information regarding the FANCM gene:  Two Harmful Variants (Biallelic): Individuals with two harmful FANCM variants are associated with conditions related to infertility, such as spermatogenic failure and premature ovarian failure.   Preliminary information regarding the FANCM gene:   Two Harmful Variants (Biallelic): Some reports link biallelic FANCM mutations to autosomal recessive Fanconi anemia, a rare bone marrow failure syndrome. However, recent studies suggest that biallelic FANCM mutations do not cause classic  Fanconi anemia.   One Harmful Variant (Monoallelic): A single harmful FANCM variant has been associated with a potential, though not fully confirmed, risk of breast cancer, particularly estrogen receptor-negative (ER-negative) and triple-negative breast cancer (TNBC) subtypes. Case-control studies suggest that the risk may vary based on the mutation s location within the gene and individual's genetic background.  Medical recommendations are not made based on preliminary evidence regarding gene associations as such findings lack sufficient validation. Preliminary evidence indicates a potential link, but further research is required to establish a reliable and clinically actionable connection.   I encouraged Cassidy to contact me regularly to review any new updates regarding the FANCM gene and how this may impact screening recommendations in the future.   We discussed Cassidy's paternal half sister's history of breast cancer at age 49 or 50.  It was reported that Cassidy's half sister had negative genetic testing, however, a copy of her report was unavailable for review today. If her half sister had negative genetic testing, this would lower the probability of finding a hereditary breast cancer syndrome in Cassidy. Furthermore, Cassidy meeting NCCN guidelines for genetic testing of genes associated with breast cancer is dependent on her paternal half sister's results.   We discussed obtaining additional information or a copy of Cassidy's paternal half sister's genetic testing results to verify her results. Cassidy planned to send me any information via Bill Me Later and then I could discuss the impact of that information on Cassidy in more detail.  We discussed the importance of Cassidy contacting me if her personal or family history changes. This may modify our assessment regarding risk for a hereditary cancer syndrome and/or genetic testing options.  Screening recommendations based upon personal/family history:     Based on her personal  and family history, Cassidy has a 10.9% lifetime risk of developing breast cancer based on the SEAMUS model. Therefore, Cassidy does not meet current National Comprehensive Cancer Network (NCCN) guidelines for high risk breast screening, which is offered to women with a 20% lifetime risk or higher. However, it is still important for Cassidy to continue with routine breast screening under the care of her physicians. Breast cancer screening is generally recommended to begin approximately 10 years younger than the earliest age of breast cancer diagnosis in the family, or at age 40, whichever comes first. In this family, screening may begin at age 40. Cassidy is encouraged to discuss breast screening with her physicians.   Other population cancer screening options, such as those recommended by the American Cancer Society and the National Comprehensive Cancer Network (NCCN), are also appropriate for Cassidy and her family. These screening recommendations may change if there are changes to Cassidy's personal and/or family history of cancer. Final screening recommendations should be made by each individual's primary care provider.  Final screening recommendations should be made by each individual's managing physician.   Of note, these screening recommendations may change should Cassidy elect to pursue genetic testing in the future.      Plan:  1) Our Genetics department  will help facilitate the KAREN process to obtain a copy of Cassidy's genetic testing results. Cassidy is also encouraged to contact me regularly to review any new updates regarding the FANCM gene.    2) Cassidy planned to gather additional information regarding her paternal half sister's genetic testing results.   3) Information regarding recommended screening, based upon the family history, was reviewed for Cassidy.  4) Cassidy will contact me regularly and/or if the family or personal history changes. My contact information was provided.     Cassidy is 44 year old  and is being evaluated via a billable video visit.    Time spent on video: 40 minutes    Ludivina Muñoz MS, [facility]  Licensed, Certified Genetic Counselor  Phone: [phone]

## 2024-10-29 ENCOUNTER — VIRTUAL VISIT (OUTPATIENT)
Dept: ONCOLOGY | Facility: CLINIC | Age: 44
End: 2024-10-29
Attending: OBSTETRICS & GYNECOLOGY
Payer: COMMERCIAL

## 2024-10-29 DIAGNOSIS — Z80.3 FAMILY HISTORY OF MALIGNANT NEOPLASM OF BREAST: ICD-10-CM

## 2024-10-29 DIAGNOSIS — Z15.89 BIALLELIC MUTATION OF FANCM GENE: Primary | ICD-10-CM

## 2024-10-29 DIAGNOSIS — Z80.42 FAMILY HISTORY OF PROSTATE CANCER: ICD-10-CM

## 2024-10-29 DIAGNOSIS — Z84.81 FAMILY HISTORY OF GENETIC DISEASE CARRIER: ICD-10-CM

## 2024-10-29 PROCEDURE — 96040 HC GENETIC COUNSELING, EACH 30 MINUTES: CPT | Mod: GT,95

## 2024-10-29 NOTE — NURSING NOTE
Current patient location: [address] [place] [id number]    Is the patient currently in the state of [place]? YES    Visit mode:VIDEO    If the visit is dropped, the patient can be reconnected by: VIDEO VISIT: Text to cell phone:   Telephone Information:   Mobile [phone]       Will anyone else be joining the visit? NO  (If patient encounters technical issues they should call [phone])    Are changes needed to the allergy or medication list? N/A    Are refills needed on medications prescribed by this physician? NO    Rooming Documentation:  Questionnaire(s) not done per department protocol    Reason for visit: Consult    Murray GARCIAF

## 2024-10-29 NOTE — Clinical Note
10/29/2024      Cassidy Klein  [address] [address]      Dear Colleague,    Thank you for referring your patient, Cassidy Klein, to the [facility]. Please see a copy of my visit note below.    Virtual Visit Details  Type of service:  Video Visit   Originating Location (pt. Location): Home  Distant Location (provider location):  Off-site  Platform used for Video Visit: Am[employer]    10/29/2024    Referring Provider: Shira Montero MD     Presenting Information:   I met with Cassidy Klein today for her video genetic counseling visit through the Cancer Risk Management Program to discuss her personal history of a FANCM mutation and family history of *** cancer. She is here today to review this history, cancer screening recommendations, and available genetic testing options.    Personal History:  Cassidy is a 44 year old female. She does not have any personal history of cancer. In her hormonal-based medical history, she had her first menstrual period at age 14, her first child at age 26, and is premenopausal. Cassidy has her ovaries, fallopian tubes and uterus in place, and reports no ovarian cancer screening to date. She reports no history of hormone replacement therapy. Cassidy reports oral contraceptive use for approximately 6 months.       Cassidy has annual clinical breast exams and mammograms. Her most recent mammogram in March 2024 found a mass with obscured margins in the right breast. A follow-up ultrasound felt that this most likely represented a fibroadenoma. Cassidy has not had a colonoscopy but will have one scheduled. She reports a history of dermatological exams. She does not regularly do any other cancer screening at this time. Cassidy reported a history of nicotine or tobacco use for approximately 5 years and occasional alcohol use.    Family History: (Please see scanned pedigree for detailed family history information)  Cassidy's mother reportedly tested positive for  the familial FANCM mutation.   Maternal grandfather was diagnosed with prostate cancer. He underwent genetic testing and was reportedly positive for a FANCM mutation.   Paternal half sister was diagnosed with breast cancer at age 50.   Her maternal ethnicity is Cym[place] and Kisw[place]ili. Her paternal ethnicity is unknown. There is no known Ashkenazi Tenriism ancestry on either side of her family. There is no reported consanguinity.    Family Structure  Daughters: ***; Sons: ***  Sisters: ***; Brothers: ***  Maternal aunts: ***; Maternal uncles: ***  Paternal aunts: ***; Paternal uncles: ***    Discussion:    FANCM  FANCM, the Fanconi anemia (FA) complementation group M gene ([id number]), was originally described as one of the members of the FA molecular pathway that is primarily responsible for the repair of the DNA integrand crosslinks through homologous recombination.   If an individual has two harmful variants in the FANCM gene this would be associated with conditions characterized by infertility (spermatogenic failure; premature ovarian failure).  Preliminary evidence  There have been reports of patients with two harmful variants in the FANCM gene associated with autosomal recessive Fanconi anemia which is a rare inherited bone marrow failure syndrome. Data, however, remains insufficient to make a determination regarding these relationships. A recent publication described that biallelic FANCM mutations do not cause classical FA and therefore should not be considered a canonical FA gene. Accumulating evidence indicates that protein truncating variants (PTVs) in this gene are not causative of FA.   There have been reports of patients with two harmful variants in the FANCM gene showed risk for breast cancer, chemotherapy toxicity and may display chromosome fragility.   If an individual has one harmful variant in the FANCM gene, there have been reports of patients with autosomal dominant predisposition to breast cancer.  Evidence is currently limited on potential cancer risks.  FANCM is emerging as a breast cancer predisposing factor conferring a greater risk specifically for these breast cancer subtypes (ER-negative andTNBC).  Over the last few years, several case-controls studies have indicated that monoallelic FANCM truncating mutations are breast cancer risk factors. Results from case-control studies have indicated that mono-allelic FANCM PTVs (truncating) are breast cancer risk factors.    Indicate that FANCM PTVs are potential risk variants for ER-negative and TNBC subtypes; more precisely, they suggest that each PTV confers an increase risk with magnitude that may vary depending on its position in the gene or on the population genetic background.  FANCM MVs (missense) are associated with familial breast cancer risk, suggesting that these variants are low-risk susceptibility variants for breast cancer.         Cassidy's personal and family history of *** cancer is suggestive of a hereditary cancer syndrome.  We briefly reviewed basic genetics principles. Many of the genes we are born with impact our risk of certain diseases, such as cancer.  When one of these genes is not working properly due to a mistake (known as a  mutation  or  variant ), this may lead to an increased risk of developing cancer.   We reviewed the features of sporadic, familial, and hereditary cancers.   Cancer is a common diagnosis which impacts many families. The vast majority of cancers are considered sporadic and not primarily due to an inherited factor. Individuals can develop cancer due to aging, chance events, environmental exposures or lifestyles.  Only ~5-10% of cancer diagnoses are thought to be caused by inheriting a mutation or variant within a single cancer susceptibility gene. Features typically seen in these high risk families include: cancers diagnosed under age 50, multiple relatives with similar cancers on the same side of the family, cancers  in multiple generations, and relatives with certain rare cancers (i.e., male breast cancer).   We discussed the natural history and genetics of several hereditary cancer syndromes, including ***.  ***Insert educational information   A detailed handout regarding information about *** and related hereditary cancer syndromes will be provided to Cassidy via [employer] ***mailed to Cassidy*** and can be found in the after visit summary. Topics included: inheritance pattern, cancer risks, cancer screening recommendations, and also risks, benefits and limitations of testing.  In looking at Cassidy's personal and family history, it is possible that a cancer susceptibility gene is present due ***.  ***Insert NCCN criteria   ***Therefore, it would be appropriate for Cassidy to purse testing to better understand her risk of a hereditary cancer syndrome and risk of developing other cancers.  We discussed that there are additional genes that could cause increased risk for *** cancer. As many of these genes present with overlapping features in a family and accurate cancer risk cannot always be established based upon the pedigree analysis alone, it would be reasonable for Cassidy to consider panel genetic testing to analyze multiple genes at once.  We reviewed genetic testing options for Cassidy based on her personal and family history: a panel of genes associated with an increased risk for ***certain cancers/ hereditary breast and gynecologic cancers/colorectal cancers***, or larger panel options to include genes associated with increased risk for multiple different cancer types. Cassidy expressed interest in *** panel.     Cassidy would like to submit a blood sample for her genetic testing. She will go to her nearest [facility] at her earliest convenience to get her blood drawn for genetic testing.   Verbal consent was given over ***video and written on the consent form. Turnaround time is approximately 3-4 weeks once the lab  receives the sample.  Medical Management: For Cassidy, we reviewed that the information from genetic testing may determine:  ***surgery to treat Cassidy's active cancer diagnosis (i.e. lumpectomy versus bilateral mastectomy, partial versus total colectomy, etc.***),  additional cancer screening for which Cassidy may qualify (i.e. mammogram and breast MRI, more frequent colonoscopies, more frequent dermatologic exams, etc.***),  options for risk reducing surgeries Cassidy could consider (i.e. bilateral mastectomy, surgery to remove her ovaries and/or uterus, etc.***),    and targeted chemotherapies for Cassidy's *** active cancer, or ***if she were to develop certain cancers in the future (i.e. immunotherapy for individuals with Paredes syndrome, PARP inhibitors, etc.***).   These recommendations and possible targeted chemotherapies will be discussed in detail once genetic testing is completed.     Plan:  1) Today Cassidy elected to proceed with ***.  2) This information should be available in 4-6*** weeks.  3) Cassidy will return to clinic to discuss the results.    Cassidy is 44 year old and is being evaluated via a billable video visit.    Time spent on video: 40 minutes    Ludivina Muñoz MS, [facility] [place]  Licensed, Certified Genetic Counselor  Phone: [phone]      Again, thank you for allowing me to participate in the care of your patient.        Sincerely,        Ludivina Muñoz GC

## 2024-10-29 NOTE — LETTER
Cancer Risk Management  Program Locations    [facility]  [facility]  [facility]  [facility]  [facility]  Mailing Address  Cancer Risk Management Program  [address]  [place] [address]    New patient appointments  [phone]    October 29, 2024    Cassidy Klein  [address] [address]    Dear Cassidy,    It was a pleasure talking with you via your virtual genetic counseling visit on 10/29/2024.  Below is a copy of the progress note from that recent visit through the Cancer Risk Management Program.  If you have any additional questions, please feel free to contact me.      Referring Provider: Shira Montero MD     Presenting Information:   I met with Cassidy Latoya today for her video genetic counseling visit through the Cancer Risk Management Program to discuss her personal history of a FANCM mutation and family history of breast and prostate cancer. She is here today to review this history, cancer screening recommendations, and available genetic testing options.    Personal History:  Cassiyd is a 44 year old female. She does not have any personal history of cancer. In her hormonal-based medical history, she had her first menstrual period at age 14, her first child at age 26, and is premenopausal. Cassidy has her ovaries, fallopian tubes and uterus in place, and reports no ovarian cancer screening to date. She reports no history of hormone replacement therapy. Cassidy reports oral contraceptive use for approximately 6 months.       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 It has been described both as a biallelic mutation in the FANCM gene and as indicating carrier status, though Cassidy could not recall whether one or two mutations were identified. A copy of her genetic testing  report was unavailable for review today.     Cassidy has annual clinical breast exams and mammograms. Her most recent mammogram in March 2024 found a mass with obscured margins in the right breast. A follow-up ultrasound felt that this most likely represented a fibroadenoma. Cassidy has not had a colonoscopy but planned to scheduled one soon. She reports a history of dermatological exams. She does not regularly do any other cancer screening at this time. Cassidy reported a history of nicotine or tobacco use for approximately 5 years and occasional alcohol use.    Family History: (Please see scanned pedigree for detailed family history information)  Cassidy's mother reportedly tested positive for the familial FANCM mutation. She is currently 72.  Maternal grandfather was diagnosed with prostate cancer in his late 80's. He underwent genetic testing and was reportedly positive for a FANCM mutation. He passed away at age 90.  Cassidy's father was diagnosed with non-metastatic prostate cancer in his 60's. He is currently 74.  Paternal half sister was diagnosed with breast cancer at age 49 or 50. It was reported that she had negative genetic testing, however, a copy of her results were unavailable for review. She is currently 53.  Her maternal ethnicity is Naur[place] and [place]. Her paternal ethnicity is unknown. There is no known Ashkenazi Latter day ancestry on either side of her family. There is no reported consanguinity.    Family Structure  Daughters: 2; Sons: 1  Sisters: 1; Brothers: 1; Paternal half sisters: 1  Maternal aunts: 0; Maternal uncles: 1  Paternal aunts: 2; Paternal uncles: 2    Discussion:  We briefly reviewed basic genetics principles. Many of the genes we are born with impact our risk of certain diseases, such as cancer.  When one of these genes is not working properly due to a mistake (known as a  mutation  or  variant ), this may lead to an increased risk of developing cancer.   We reviewed the features of sporadic,  familial, and hereditary cancers.   Cancer is a common diagnosis which impacts many families. The vast majority of cancers are considered sporadic and not primarily due to an inherited factor. Individuals can develop cancer due to aging, chance events, environmental exposures or lifestyles.  Only ~5-10% of cancer diagnoses are thought to be caused by inheriting a mutation or variant within a single cancer susceptibility gene. Features typically seen in these high risk families include: cancers diagnosed under age 50, multiple relatives with similar cancers on the same side of the family, cancers in multiple generations, and relatives with certain rare cancers (i.e., male breast cancer).   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  Cassidy stated that her maternal grandfather was diagnosed with prostate cancer in his late 80's and underwent genetic testing. He was the first individual identified to have the FANCM mutation. Afterwards her mother had positive genetic testing for the familial FANCM mutation, and then Cassidy had positive testing.   Cassidy's chart describes her results as both a biallelic mutation in the FANCM gene and elsewhere as carrier status. Cassidy could not recall if one or two mutations were identified. A copy of her genetic testing report was unavailable for review today. However, she report that this would have been done in 2019 at [facility]. Cassidy was also unsure whether her testing included additional genes.   We discussed the importance of obtaining a copy of Cassidy's genetic testing results for review to verify and clarify the reported findings. Access to these results will allow for a thorough review, ensuring accuracy in assessing risk, and enabling use to make informed recommendations for screening and management. Our Genetics department  will help facilitate the KAREN process.   We reviewed current information  regarding the FANCM gene.  Established information regarding the FANCM gene:  Two Harmful Variants (Biallelic): Individuals with two harmful FANCM variants are associated with conditions related to infertility, such as spermatogenic failure and premature ovarian failure.   Preliminary information regarding the FANCM gene:   Two Harmful Variants (Biallelic): Some reports link biallelic FANCM mutations to autosomal recessive Fanconi anemia, a rare bone marrow failure syndrome. However, recent studies suggest that biallelic FANCM mutations do not cause classic Fanconi anemia.   One Harmful Variant (Monoallelic): A single harmful FANCM variant has been associated with a potential, though not fully confirmed, risk of breast cancer, particularly estrogen receptor-negative (ER-negative) and triple-negative breast cancer (TNBC) subtypes. Case-control studies suggest that the risk may vary based on the mutation s location within the gene and individual's genetic background.  Medical recommendations are not made based on preliminary evidence regarding gene associations as such findings lack sufficient validation. Preliminary evidence indicates a potential link, but further research is required to establish a reliable and clinically actionable connection.   I encouraged Cassidy to contact me regularly to review any new updates regarding the FANCM gene and how this may impact screening recommendations in the future.   We discussed Cassidy's paternal half sister's history of breast cancer at age 49 or 50.  It was reported that Cassidy's half sister had negative genetic testing, however, a copy of her report was unavailable for review today. If her half sister had negative genetic testing, this would lower the probability of finding a hereditary breast cancer syndrome in Cassidy. Furthermore, Cassidy meeting NCCN guidelines for genetic testing of genes associated with breast cancer is dependent on her paternal half sister's results.   We  discussed obtaining additional information or a copy of Cassidy's paternal half sister's genetic testing results to verify her results. Cassidy planned to send me any information via [email or web address] and then I could discuss the impact of that information on Cassidy in more detail.  We discussed the importance of Cassidy contacting me if her personal or family history changes. This may modify our assessment regarding risk for a hereditary cancer syndrome and/or genetic testing options.  Screening recommendations based upon personal/family history:     Based on her personal and family history, Cassidy has a 10.9% lifetime risk of developing breast cancer based on the SEAMUS model. Therefore, Cassidy does not meet current National Comprehensive Cancer Network (NCCN) guidelines for high risk breast screening, which is offered to women with a 20% lifetime risk or higher. However, it is still important for Cassidy to continue with routine breast screening under the care of her physicians. Breast cancer screening is generally recommended to begin approximately 10 years younger than the earliest age of breast cancer diagnosis in the family, or at age 40, whichever comes first. In this family, screening may begin at age 40. Cassidy is encouraged to discuss breast screening with her physicians.   Other population cancer screening options, such as those recommended by the American Cancer Society and the National Comprehensive Cancer Network (NCCN), are also appropriate for Cassidy and her family. These screening recommendations may change if there are changes to Cassidy's personal and/or family history of cancer. Final screening recommendations should be made by each individual's primary care provider.  Final screening recommendations should be made by each individual's managing physician.   Of note, these screening recommendations may change should Cassidy elect to pursue genetic testing in the future.      Plan:  1) Our Genetics department Business  Coordinator will help facilitate the KAREN process to obtain a copy of Cassidy's genetic testing results. Cassidy is also encouraged to contact me regularly to review any new updates regarding the FANCM gene.    2) Cassidy planned to gather additional information regarding her paternal half sister's genetic testing results.   3) Information regarding recommended screening, based upon the family history, was reviewed for Cassidy.  4) Cassidy will contact me regularly and/or if the family or personal history changes. My contact information was provided.     Cassidy is 44 year old and is being evaluated via a billable video visit.    Time spent on video: 40 minutes    Ludivina Muñoz MS, [facility] [place]  Licensed, Certified Genetic Counselor  Phone: [phone]

## 2024-10-31 NOTE — PATIENT INSTRUCTIONS
Assessing Cancer Risk  Cancer is a common diagnosis which impacts many families.  Individuals may develop cancer due to environmental factors (such as exposures and lifestyle), aging, genetic predisposition, or a combination of these factors.      Only about 5-10% of cancers are thought to be due to an inherited cancer susceptibility gene.    These families often have:  Several people with the same or related types of cancer  Cancers diagnosed at a young age (before age 50)  Individuals with more than one primary cancer  Multiple generations of the family affected with cancer    Comprehensive Breast and Gynecologic Cancer Panel  We each inherit two copies of every gene in our bodies: one from our mother, and one from our father. Each gene has a specific job to do.  When a gene has a mistake or  mutation  in it, it does not work like it should.     Some people may be candidates for genetic testing of more than one gene.  For these families, genetic testing using a cancer panel may be offered. These panels will test different genes at once known to increase the risk for breast, ovarian, uterine, and/or other cancers.    This handout will review common hereditary breast and gynecologic cancer syndromes. The genes that will be discussed in this handout are: KT, BRCA1, BRCA2, BRIP1, CDH1, CHEK2, MLH1, MSH2, MSH6, PMS2, EPCAM, PTEN, PALB2, RAD51C, RAD51D, and TP53.    The purpose of this handout is to serve as a brief summary of the breast and gynecologic cancer risk genes that have published clinical management guidelines for individuals who are found to carry a mutation. Inheriting a mutation does not mean a person will develop cancer, but it does significantly increase their risk above the general population risk.     ______________________________________________________________________________    Hereditary Breast and Ovarian Cancer Syndrome (BRCA1 and BRCA2)  A single mutation in one of the copies of BRCA1 or  BRCA2 increases the risk for breast and ovarian cancer, among others.  The risk for pancreatic cancer and melanoma may also be slightly increased in some families.  The chart below shows the chance that someone with a BRCA mutation would develop cancer in his or her lifetime1,2,3,4.       Lifetime Cancer Risks    General Population BRCA1  BRCA2   Breast  12% >60% >60%   Ovarian  1-2% 39-58% 13-29%   Prostate 12% 7-26% 19-61%   Male Breast 0.1% 0.2-1.2% 1.8-7.1%   Pancreas 1-2% Up to 5% 5-10%     A person s ethnic background is also important to consider, as individuals of Ashkenazi Mandaeism ancestry have a higher chance of having a BRCA gene mutation.  There are three BRCA mutations that occur more frequently in this population.      Paredes Syndrome (MLH1, MSH2, MSH6, PMS2, and EPCAM)  Currently five genes are known to cause Paredes Syndrome: MLH1, MSH2, MSH6, PMS2, and EPCAM.  A single mutation in one of the Paredes Syndrome genes increases the risk for colon, endometrial, ovarian, and stomach cancers.  Other cancers that occur less commonly in Paredes Syndrome include urinary tract, skin, and brain cancers.  The chart below shows the chance that a person with Paredes syndrome would develop cancer in his or her lifetime5.      Lifetime Cancer Risks    General Population Paredes Syndrome   Colon 5% 10-61%   Endometrial 3% 13-57%   Ovarian 1-2% 1-38%   Stomach <1% 1-9%   *Cancer risk varies depending on Paredes syndrome gene found      Cowden Syndrome (PTEN)  Cowden syndrome is a hereditary condition that increases the risk for breast, thyroid, endometrial, colon, and kidney cancer.  Cowden syndrome is caused by a mutation in the PTEN gene.  A single mutation in one of the copies of PTEN causes Cowden syndrome and increases cancer risk.  The chart below shows the chance that someone with a PTEN mutation would develop cancer in their lifetime6,7.  Other benign features seen in some individuals with Cowden syndrome include benign  skin lesions (facial papules, keratoses, lipomas), learning disability, autism, thyroid nodules, colon polyps, and larger head size.     Lifetime Cancer Risks    General Population Cowden   Breast 12% 40-60%*   Thyroid 1% Up to 38%   Renal 1-2% Up to 35%   Endometrial 3% Up to 28%   Colon 5% Up to 9%   Melanoma 2-3% Up to 6%   *Emerging data suggests the risk for breast cancer could be greater than 60%               Li-Fraumeni Syndrome (TP53)  Li-Fraumeni Syndrome (LFS) is a cancer predisposition syndrome caused by a mutation in the TP53 gene. A single mutation in one of the copies of TP53 increases the risk for multiple cancers. Individuals with LFS are at an increased risk for developing cancer at a young age. The lifetime risk for development of a LFS-associated cancer is 50% by age 30 and 90% by age 60.   Core Cancers: Sarcomas, Breast, Brain, Lung, Leukemias/Lymphomas, Adrenocortical carcinomas  Other Cancers: Gastrointestinal, Thyroid, Skin, Genitourinary       Hereditary Diffuse Gastric Cancer (CDH1)  Currently, one gene is known to cause hereditary diffuse gastric cancer (HDGC): CDH1.  Individuals with HDGC are at increased risk for diffuse gastric cancer and lobular breast cancer. Of people diagnosed with HDGC, 30-50% have a mutation in the CDH1 gene.  This suggests there are likely other genes that may cause HDGC that have not been identified yet.      Lifetime Cancer Risks    General Population HDGC   Diffuse Gastric  <1% ~80%   Breast 12% 41-60%       Additional Genes    KT  KT is a moderate-risk breast cancer gene. Women who have a mutation in KT can have between a 2-4 fold increased risk for breast cancer compared to the general population8. TK mutations have also been associated with increased risk for pancreatic cancer between 5-10%9. Individuals who inherit two KT mutations have a condition called ataxia-telangiectasia (AT).  This rare autosomal recessive condition affects the nervous system  and immune system, and is associated with progressive cerebellar ataxia beginning in childhood. Individuals with ataxia-telangiectasia often have a weakened immune system and have an increased risk for childhood cancers.    PALB2  Mutations in PALB2 have been shown to increase the risk of breast cancer up to 41-60% in some families; where individuals fall within this risk range is dependent upon family [id number]. PALB2 mutations have also been associated with increased risk for pancreatic cancer between 5-10%.  Individuals who inherit two PALB2 mutations--one from their mother and one from their father--have a condition called Fanconi Anemia.  This rare autosomal recessive condition is associated with short stature, developmental delay, bone marrow failure, and increased risk for childhood cancers.    CHEK2   CHEK2 is a moderate-risk breast cancer gene.  Women who have a mutation in CHEK2 have around a 2-4 fold increased risk for breast cancer compared to the general population, and this risk may be higher depending upon family history.11,12,13 The risk of colon cancer may be twice as high as the general population risk of colon cancer of 5%. Mutations in CHEK2 have also been shown to increase the risk of other cancers, including prostate, however these cancer risks are currently not well understood.    BRIP1, RAD51C and RAD51D  Mutations in RAD51C and RAD51D have been shown to increase the risk of ovarian cancer and breast cancer 14,. Mutations in BRIP1 have been shown to increase the risk of ovarian cancer and possibly female breast cancer 15 .       Lifetime Cancer Risk    General Population        BRIP1   RAD51C  RAD51D   Breast 12% Not well defined 20-40% 20-40%   Ovarian 1-2% 5-15% 10-15% 10-20%     ______________________________________________________________  Inheritance  All of the cancer syndromes reviewed above are inherited in an autosomal dominant pattern.  This means that if a parent has a mutation,  each of their children will have a 50% chance of inheriting that same mutation. Therefore, each child --male or female-- would have a 50% chance of being at increased risk for developing cancer.    Image obtained from Genetics Home Reference, 2013     Mutations in some genes can occur de juan manuel, which means that a person s mutation occurred for the first time in them and was not inherited from a parent.  Now that they have the mutation, however, it can be passed on to future generations.    Genetic Testing  Genetic testing involves a blood test and will look for any harmful mutations that are associated with increased cancer risk.  If possible, it is recommended that the person(s) who has had cancer be tested before other family members.  That person will give us the most useful information about whether or not a specific gene is associated with the cancer in the family.    Results  There are three possible results of genetic testing:  Positive--a harmful mutation was identified in one or more of the genes  Negative--no mutations were identified in any of the genes tested  Variant of unknown significance--a variation in one of the genes was identified, but it is unclear how this impacts cancer risk in the family    Advantages and Disadvantages   There are advantages and disadvantages to genetic testing.    Advantages  May clarify your cancer risk  Can help you make medical decisions  May explain the cancers in your family  May give useful information to your family members (if you share your results)    Disadvantages  Possible negative emotional impact of learning about inherited cancer risk  Uncertainty in interpreting a negative test result in some situations  Possible genetic discrimination concerns (see below)    Genetic Information Nondiscrimination Act (ASHLYN)  The Genetic Information Nondiscrimination Act of 2008 (ASHLYN) is a federal law that protects individuals from health insurance or employment discrimination  based on a genetic test result alone (with some exceptions, including employers with fewer than 15 employees, and ).  Although rare, ASHLYN  does not cover discrimination protections in terms of life insurance, long term care, or disability insurances.  Visit the National Human [employer] Research [place] website to learn more.    Reducing Cancer Risk  All of the genes described in this handout have nationally recognized cancer screening guidelines that would be recommended for individuals who test positive.  In addition to increased cancer screening, surgeries may be offered or recommended to reduce cancer risk.  Recommendations are based upon an individual s genetic test result as well as their personal and family history of cancer.    Questions to Think About Regarding Genetic Testing:  What effect will the test result have on me and my relationship with my family members if I have an inherited gene mutation?  If I don t have a gene mutation?  Should I share my test results, and how will my family react to this news, which may also affect them?  Are my children ready to learn new information that may one day affect their own health?    Hereditary Cancer Resources    FORCE: Facing Our Risk of Cancer Empowered [email or web address]   Bright Pink bebrightpink.org   Li-Fraumeni Syndrome Association lfsassociation.org   PTEN World PTENworld.com   No stomach for cancer, Inc. nostomachforcancer.[email or web address]   Stomach cancer relief network Scrnet.org   Collaborative Group of the Americas on Inherited Colorectal Cancer (CGA) cgaicc.com    Cancer Care cancercare.org   American Cancer Society (ACS) cancer.org   National Cancer [place] (NCI) cancer.gov     Please call us if you have any questions or concerns.   Cancer Risk Management Program [phone]-CANCER ([phone])  Dada Palma, MS [facility] [place]  [phone]  Ludivina Muñoz, MS, [facility] [phone]  Sandee Hunter, MS, [facility]  [phone]  Terri Salmeron, MS, [facility]  [phone]  Bailee Langston,  MS, [facility] [place]  [phone]  Lacey Hagan, MS, [facility] [place] [phone]  Katelynn Chaidez, MS, [facility] [place] [phone]    References  Marlys Gresham PDP, Javier S, Erich BROWN, Todd JE, Luke JL, Melodie N, Jeimy H, Zac O, Faiza A, Pasini B, Radiammy P, Mandiann S, Bryan DM, Nicholson N, Radha E, Noemi H, Larson E, Maritza J, Gronmary J, Nohemi B, Tulinius H, Thorlacius S, Eerola H, Nevanlinna H, Jeremy K, Deidra OP. Average risks of breast and ovarian cancer associated with BRCA1 or BRCA2 mutations detected in case series unselected for family history: a combined analysis of 222 studies. Am J Hum Aby. 2003;72:1117-30.  Gus N, Christiana M, Veronica G.  BRCA1 and BRCA2 Hereditary Breast and Ovarian Cancer. Gene Reviews online. 2013.  Ignacio YC, Christiano S, Willian G, Ronquillo S. Breast cancer risk among male BRCA1 and BRCA2 mutation carriers. J Natl Cancer Inst. 2007;99:1811-4.  Andrzej LORENZO, Rob I, Keaton J, Amanda E, Adela ER, Todd F. Risk of breast cancer in male BRCA2 carriers. J Med Aby. 2010;47:710-1.  National Comprehensive Cancer Network. Clinical practice guidelines in oncology, colorectal cancer screening. Available online (registration required). 2015.  Miguel Ángel MH, Jamee J, Lena J, Eleazar BATEMAN, Patricia MS, Eng C. Lifetime cancer risks in individuals with germline PTEN mutations. Clin Cancer Res. 2012;18:400-7.  Acacia R. Cowden Syndrome: A Critical Review of the Clinical Literature. J Aby . 2009:18:13-27.  Jose LARSON, Yuval ARANA, Asia S, Katie P, Sheldon T, Fabi M, Gautam B, Jackson H, Mansi R, Ara K, Rosmery L, Andrzej LORENZO, Bryan ARANA, Lewis DF, Efrem MR, The Breast Cancer Susceptibility Collaboration (UK) & Terrance ROMO. KT mutations that cause ataxia-telangiectasia are breast cancer susceptibility alleles. Nature Genetics. 2006;38:873-875  Thanh N , Cameron Y, Kaitlin J, Madhavi L, Conchita HESTER , Shon ML, Patti S, Kayla AG, Lisseth S, Viktor ML, Ghulam J , Lobo R, Concepción CARMONA, Flo  JR, Marshall VE, Jose Manuel M, Vovinaystein B, Peter N, Sudarshan RH, Hina KW, and Michael AP. KT mutations in patients with hereditary pancreatic cancer. Cancer Discover. 2012;2:41-46  Glory HALL., et al. Breast-Cancer Risk in Families with Mutations in PALB2. NEJM. 2014; 371(6):497-506.  CHEK2 Breast Cancer Case-Control Consortium. CHEK2*1100delC and susceptibility to breast cancer: A collaborative analysis involving 10,860 breast cancer cases and 9,065 controls from 10 studies. Am J Hum Aby, 74 (2004), pp. 0468-5644  Dayron T, Adán S, Liu K, et al. Spectrum of Mutations in BRCA1, BRCA2, CHEK2, and TP53 in Families at High Risk of Breast Cancer. DONALDO. 2006;295(12):0287-1062.   Danny C, Satya D, Aline LARSON, et al. Risk of breast cancer in women with a CHEK2 mutation with and without a family history of breast cancer. J Clin Oncol. 2011;29:9077-8569.  Song H, Cadens E, Ramus SJ, et al. Contribution of germline mutations in the RAD51B, RAD51C, and RAD51D genes to ovarian cancer in the population. J Clin Oncol. 2015;33(26):6201-1285. Doi:10.1200/JCO.2015.61.2408.  Magnolia T, Golden DF, Yohana P, et al. Mutations in BRIP1 confer high risk of ovarian cancer. Heydi Aby. 2011;43(11):5917-1778. doi:10.1038/ng.955.

## 2025-07-12 ENCOUNTER — HEALTH MAINTENANCE LETTER (OUTPATIENT)
Age: 45
End: 2025-07-12